# Patient Record
Sex: MALE | Race: WHITE | NOT HISPANIC OR LATINO | URBAN - METROPOLITAN AREA
[De-identification: names, ages, dates, MRNs, and addresses within clinical notes are randomized per-mention and may not be internally consistent; named-entity substitution may affect disease eponyms.]

---

## 2018-03-16 ENCOUNTER — OFFICE VISIT (OUTPATIENT)
Dept: URGENT CARE | Facility: CLINIC | Age: 23
End: 2018-03-16
Payer: OTHER GOVERNMENT

## 2018-03-16 ENCOUNTER — APPOINTMENT (OUTPATIENT)
Dept: RADIOLOGY | Facility: IMAGING CENTER | Age: 23
End: 2018-03-16
Attending: FAMILY MEDICINE
Payer: OTHER GOVERNMENT

## 2018-03-16 VITALS
RESPIRATION RATE: 16 BRPM | SYSTOLIC BLOOD PRESSURE: 118 MMHG | HEART RATE: 72 BPM | DIASTOLIC BLOOD PRESSURE: 70 MMHG | HEIGHT: 70 IN | TEMPERATURE: 97.8 F | OXYGEN SATURATION: 96 % | WEIGHT: 189.6 LBS | BODY MASS INDEX: 27.14 KG/M2

## 2018-03-16 DIAGNOSIS — M79.645 FINGER PAIN, LEFT: ICD-10-CM

## 2018-03-16 PROCEDURE — 73140 X-RAY EXAM OF FINGER(S): CPT | Mod: TC,LT | Performed by: FAMILY MEDICINE

## 2018-03-16 PROCEDURE — 99203 OFFICE O/P NEW LOW 30 MIN: CPT | Performed by: FAMILY MEDICINE

## 2018-03-17 NOTE — PROGRESS NOTES
"Subjective:      Chief Complaint   Patient presents with   • Finger Injury     slammed pinky on (R) hand in car door around 11:30 AM          HPI       C/o dull constant, throbbing rt pinky finger pain x 1 hr after it was accidentally caught in car door.  Denies any trauma.   Pt has not tried anything for the pain.  Pain is worse with bending it.   Denies fevers, chills, redness, swelling.         Social History   Substance Use Topics   • Smoking status: Never Smoker   • Smokeless tobacco: Not on file   • Alcohol use No             No current outpatient prescriptions on file prior to visit.     No current facility-administered medications on file prior to visit.          Past Medical History:   Diagnosis Date   • Renal disorder     torn left kidney 2006               Review of Systems   Constitutional: Negative for fever.   Respiratory: Negative for cough.    Cardiovascular: Negative for chest pain.   All other systems reviewed and are negative.         Objective:     Blood pressure 118/70, pulse 72, temperature 36.6 °C (97.8 °F), resp. rate 16, height 1.778 m (5' 10\"), weight 86 kg (189 lb 9.5 oz), SpO2 96 %.    Physical Exam   Constitutional: pt is oriented to person, place, and time. Pt appears well-developed and well-nourished. No distress.   HENT:   Head: Normocephalic and atraumatic.   Eyes: Conjunctivae are normal.   Cardiovascular: Normal rate.  RRR.  No murmer   Pulmonary/Chest: Effort normal.  CTAB  Musculoskeletal:        Right hand:  he exhibits tenderness (at distal rt 5th digit.  no swelling or erythema). Normal sensation noted. Normal strength noted.   Neurological: pt is alert and oriented to person, place, and time.   Skin: Skin is warm. Pt is not diaphoretic. No erythema.   Nursing note and vitals reviewed.              Assessment/Plan:     1. Finger pain, left    X-rays were personally reviewed by myself.   There is no fracture.     rx motrin 800mg tid, prn    Follow up in one week if no " improvement, sooner if symptoms worsen.

## 2019-12-24 ENCOUNTER — APPOINTMENT (OUTPATIENT)
Dept: URGENT CARE | Facility: CLINIC | Age: 24
End: 2019-12-24
Payer: OTHER GOVERNMENT